# Patient Record
Sex: FEMALE | Race: WHITE
[De-identification: names, ages, dates, MRNs, and addresses within clinical notes are randomized per-mention and may not be internally consistent; named-entity substitution may affect disease eponyms.]

---

## 2020-05-12 ENCOUNTER — HOSPITAL ENCOUNTER (OUTPATIENT)
Dept: HOSPITAL 53 - M SFHCWAGY | Age: 50
End: 2020-05-12
Attending: NURSE PRACTITIONER
Payer: COMMERCIAL

## 2020-05-12 ENCOUNTER — HOSPITAL ENCOUNTER (OUTPATIENT)
Dept: HOSPITAL 53 - M WHC | Age: 50
End: 2020-05-12
Attending: NURSE PRACTITIONER
Payer: COMMERCIAL

## 2020-05-12 DIAGNOSIS — Z78.0: ICD-10-CM

## 2020-05-12 DIAGNOSIS — Z12.31: Primary | ICD-10-CM

## 2020-05-12 DIAGNOSIS — Z80.8: ICD-10-CM

## 2020-05-12 DIAGNOSIS — R87.610: Primary | ICD-10-CM

## 2020-05-26 NOTE — REPMRS
Patient History

The patient states she had a clinical breast exam in May 2020.

 

Patient is postmenopausal and is nulliparous.

Family history of unknown cancer in father.

 

Digital Woman Screen Mammo: May 12, 2020 - Exam #: 

[id number]

Bilateral CC and MLO view(s) were taken.

 

Technologist: Kerry Nugent, Technologist

Prior study comparison: February 1, 2019, bilateral digital woman

screen mammo, performed at [facility].

 

FINDINGS:  The breast tissue is almost entirely fat.  The Volpara

volumetric breast density category is: A.  There has been no 

change in the appearance of the mammogram from the prior studies.

There is no interval development of dominant mass, 

architectural distortion, or grouped microcalcification typical 

of malignancy.

3-D tomosynthesis shows no additional findings.

 

Assessment: BI-RADS/ACR category 1 mammogram. Negative Mammogram.

 

Recommendation

Routine screening mammogram of both breasts in 1 year (for women 

over age 40).

This patient's Lifetime Breast Cancer RIsk is estimated at 12.0 

%.

This mammogram was interpreted with the aid of an FDA-approved 

computer-aided dectection system.

 

Electronically Signed By: Ej Lopez MD 05/26/20 5270

## 2021-01-26 ENCOUNTER — HOSPITAL ENCOUNTER (OUTPATIENT)
Dept: HOSPITAL 53 - M RAD | Age: 51
End: 2021-01-26
Attending: INTERNAL MEDICINE
Payer: COMMERCIAL

## 2021-01-26 DIAGNOSIS — K43.9: Primary | ICD-10-CM

## 2021-01-26 PROCEDURE — 74177 CT ABD & PELVIS W/CONTRAST: CPT

## 2021-01-27 NOTE — REP
INDICATION:

VENTRAL HERNIA.



COMPARISON:

None



TECHNIQUE:

Axial contrast-enhanced images from the lung bases to the pubic symphysis using oral

and 100 cc Isovue 370 intravenous contrast material.  Coronal and sagittal

reformations obtained.



This CT examination was performed using the following dose reduction techniques:

Automated exposure control, adjustment of mA and/or kv according to the patient's

size, and the use of iterative reconstruction technique.



FINDINGS:

There appears to be eventration of the anterior abdominal wall and possible associated

ventral hernia along the right mid to lower abdomen/pelvis which contain fat and

nonobstructed bowel.



Liver, spleen, pancreas, gallbladder, left kidney and right adrenal gland are normal.

Left adrenal gland includes 1.5 cm adenoma.  Right kidney suggests small punctate

nonobstructing calculi.  There is no evidence for bowel obstruction or acute

inflammatory process.



Pelvis demonstrates normal bladder and age-appropriate uterus/right adnexa.  There is

a complex septated left adnexal cyst measuring 4.5 x 6.6 x 4.0 cm.



No ascites.  No free air.  No adenopathy.  Abdominal aorta without aneurysm or

dissection.  Skeletal structures demonstrate age-related degenerative changes without

acute osseous abnormality.  Lung bases are clear.



IMPRESSION:

1. Eventration along the right anterior abdominal versus wide ventral hernia contains

fat and nonobstructed bowel.  No associated inflammatory changes or obstruction noted.

2. 6.6 cm complex septated left adnexal cyst warrants follow-up.

3. Further nonacute findings as described above.







<Electronically signed by Timmy Cruz > 01/27/21 1042

## 2021-01-29 ENCOUNTER — HOSPITAL ENCOUNTER (OUTPATIENT)
Dept: HOSPITAL 53 - M RAD | Age: 51
End: 2021-01-29
Attending: INTERNAL MEDICINE
Payer: COMMERCIAL

## 2021-01-29 DIAGNOSIS — K43.9: Primary | ICD-10-CM

## 2021-01-29 NOTE — REP
INDICATION:

VENTRAL HERNIA, ABD PAIN, EVAL FOR ADHESIONS.



COMPARISON:



None



TECHNIQUE:

This procedure was performed by Sara Treviño [facility], under the direct

supervision of Dr. Lopez.  Images were reviewed with Dr. Lopez prior to dictation.



Liquid barium and gas producing crystals were given in the erect position, as well as

liquid barium in the prone oblique position in order to perform a double contrast

upper GI examination.  Additionally liquid barium was given at the end of the

examination in order to perform a small-bowel follow-through.





FINDINGS:

The  film shows no organomegaly or pathological masses.  The intestinal gas

pattern is unremarkable.



The oral and pharyngeal stages of deglutition were unremarkable.  Flash penetration

was visualized.  Esophageal transport is prompt and efficient and there is no evidence

of esophagitis, stricture, or mucosal ring.  There is no evidence of a hiatal hernia.

There was no gastroesophageal reflux noted .



The stomach walls are normally outlined.  The rugal folds are smooth and regular.

There is no gastritis, neoplasm, or ulcerative disease.



The duodenal walls are normally outlined.  There is a diverticulum of the 3rd portion

of the duodenum.  The mucosal folds are smooth and regular.  There is no duodenitis,

peptic ulcer disease or neoplasm.  The visualized portion of the proximal small bowel

appears normal in course and caliber.



The barium column was followed through the small bowel to the level of the terminal

ileum.  Small bowel transit time is approximately 150 minutes.  During fluoroscopy

gentle palpation shows all loops are freely movable and pliable.  There is no fixed

angulated loops.  The small bowel mucosal pattern is normal in course and caliber.

There is no transition to suggest a partial small bowel obstruction.  Spot filming of

the terminal ileum shows it to be unremarkable.





IMPRESSION:

1.  Flash penetration.

2.  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

3.  Unremarkable small bowel follow-through.



0.5 minutes of fluoroscopy time was utilized for this procedure. Some fluoroscopic

images are performed with last image hold technology.  These images require no

additional radiation.



<Electronically signed by Sara Treviño > 01/29/21 1608

<Electronically signed by Ej Lopez > 01/29/21 [id number]

## 2021-03-22 ENCOUNTER — HOSPITAL ENCOUNTER (OUTPATIENT)
Dept: HOSPITAL 53 - M LAB | Age: 51
End: 2021-03-22
Attending: OBSTETRICS & GYNECOLOGY
Payer: COMMERCIAL

## 2021-03-22 DIAGNOSIS — N83.292: Primary | ICD-10-CM

## 2021-04-08 ENCOUNTER — HOSPITAL ENCOUNTER (OUTPATIENT)
Dept: HOSPITAL 53 - M RAD | Age: 51
End: 2021-04-08
Attending: PHYSICIAN ASSISTANT
Payer: COMMERCIAL

## 2021-04-08 DIAGNOSIS — M51.37: Primary | ICD-10-CM

## 2021-04-08 NOTE — REP
INDICATION:

PAIN IN THORACIC SPINE.



COMPARISON:

None.



TECHNIQUE:

Sagittal T1, T2, stir images of the thoracic spine are obtained.  Axial T1 and T2

weighted images are obtained.



FINDINGS:

There is mild-to-moderate multilevel degenerative disc disease with loss of disc

height and disc desiccation seen diffusely throughout the thoracic spine.  Vertebral

heights are overall preserved.  No malalignments.  On the sagittal T2 weighted images,

no limiting canal stenosis.  Thoracic cord appears normal in its course, caliber and

signal characteristics.



On the review of axial images, there are shallow disc protrusions at midthoracic

level, greater on the left, which mildly narrow the central canal but no significant

impingement of the thoracic cord, and no definite limiting foraminal stenosis or focal

disc herniation.



IMPRESSION:

No acute findings.  No disc herniations, no limiting canal or foraminal stenosis.





<Electronically signed by Jorge Carroll > 04/08/21 1154

## 2021-04-08 NOTE — REP
INDICATION:

DDD, THORACIC PAIN.



COMPARISON:

CT abdomen pelvis 01/26/2021



TECHNIQUE:

Sagittal T1, T2, stir images of the lumbar spine are obtained.  Axial T1 and T2

weighted images obtained.



FINDINGS:

On the sagittal T2 weighted images, there is mild-to-moderate multilevel degenerative

disc disease, appears slightly more progressed at the L5-S1 level.  Vertebral heights

are overall preserved.  No malalignments.  On the sagittal T2 weighted images, the

conus ends normally at L1 level.  There is no significant canal stenosis at lumbar

levels.



On the review of axial images,

At L1-2 through L3-4 no significant canal or foraminal narrowing.

At L4-5 global disc bulge with mild bilateral foraminal narrowing and no significant

canal stenosis.

At L5-S1 loss of disc height with moderate bilateral foraminal narrowing that appears

greater on the right.  No significant canal stenosis at this level.



IMPRESSION:

Mild, multilevel degenerative disc disease.  No limiting canal stenosis or high-grade

foraminal stenosis.

At L5-S1 loss of disc height with moderate bilateral foraminal narrowing that appears

greater on the right.





<Electronically signed by Jorge Carroll > 04/08/21 0897

## 2021-04-27 ENCOUNTER — HOSPITAL ENCOUNTER (OUTPATIENT)
Dept: HOSPITAL 53 - M RAD | Age: 51
End: 2021-04-27
Attending: OBSTETRICS & GYNECOLOGY
Payer: COMMERCIAL

## 2021-04-27 DIAGNOSIS — N83.292: Primary | ICD-10-CM

## 2021-04-27 NOTE — REP
INDICATION:

OVARIAN CYST.



COMPARISON:

03/22/2021



TECHNIQUE:

Transvesical and transvaginal imaging



FINDINGS:

The uterus measures 4.4 x 2.1 x 2.6 cm.  The parenchymal echo pattern is unchanged.

The endometrial echo complex measures 8 mm in its greatest thickness.



Right ovary measures 2.1 x 1.2 x 1.1 cm and is within normal limits.



Left ovary measures 6.6 x 2.7 x 6.3 cm.  Within the left adnexa there is a complex 5.9

x 2.5 x 5.2 cm sized cystic mass.



The urinary bladder was empty and could not be measured



IMPRESSION:

Complex left adnexal cystic mass essentially unchanged.





<Electronically signed by Orlin Hastings > 04/27/21 0937

## 2021-09-02 ENCOUNTER — HOSPITAL ENCOUNTER (OUTPATIENT)
Dept: HOSPITAL 53 - M RAD | Age: 51
End: 2021-09-02
Attending: FAMILY MEDICINE
Payer: COMMERCIAL

## 2021-09-02 DIAGNOSIS — R10.9: Primary | ICD-10-CM

## 2021-09-02 PROCEDURE — 74177 CT ABD & PELVIS W/CONTRAST: CPT

## 2021-09-03 NOTE — REP
INDICATION:

ABD PAIN.



COMPARISON:

01/26/2021



TECHNIQUE:

Axial contrast-enhanced images from the lung bases to the pubic symphysis using oral

and 100 cc Isovue 370 intravenous contrast material.  Coronal and sagittal

reformations obtained.



This CT examination was performed using the following dose reduction techniques:

Automated exposure control, adjustment of mA and/or kv according to the patient's

size, and the use of iterative reconstruction technique.



FINDINGS:

Liver demonstrates mild fatty infiltration.  The adrenal glands demonstrate small

stable benign adenomas.



Spleen, pancreas, gallbladder, and kidneys are normal.  3 mm nonobstructing right

renal calculus is suspected.



The current examination demonstrates relatively recent postoperative changes in the

midline subcutaneous tissue including small amount of fluid, inflammatory stranding

and granulation tissue.  There is a moderate to large new right lateral ventral hernia

(spigelian hernia) containing nonobstructed loops of small and large bowel.  Remainder

of the enteric system is without obstruction or acute inflammatory process.  Normal

terminal ileum and appendix are identified in the right lower quadrant.  Evidence for

prior partial sigmoid resection.



Pelvis demonstrates normal bladder and age-appropriate uterus/right adnexa.  Tubular

cystic structure in the left adnexa is again noted and may represent hydrosalpinx.



No ascites.  No free air.  No intraperitoneal or retroperitoneal adenopathy.

Abdominal aorta and vasculature appear normal.  Musculoskeletal structures are intact

and without acute osseous abnormality.



IMPRESSION:

1. Postsurgical changes in the midline anterior abdominal wall with small amount of

fluid.  Correlation and follow-up is recommended to exclude the possibility of forming

small phlegmon/abscess.

2. New right lateral ventral (spigelian) hernia containing nonobstructed loops of

small and large bowel.

3. Stable tubular structure in the left adnexa suggesting hydrosalpinx.







<Electronically signed by Timmy Cruz > 09/03/21 [id number]

## 2021-10-29 ENCOUNTER — HOSPITAL ENCOUNTER (OUTPATIENT)
Dept: HOSPITAL 53 - M RAD | Age: 51
End: 2021-10-29
Attending: OBSTETRICS & GYNECOLOGY
Payer: COMMERCIAL

## 2021-10-29 DIAGNOSIS — N83.292: Primary | ICD-10-CM

## 2021-10-29 NOTE — REP
INDICATION:

OTH OVARIAN CYST LT SIDE.



COMPARISON:

04/27/2021



TECHNIQUE:

Transvesical and transvaginal imaging



FINDINGS:

The uterus measures 3.5 x 2.1 x 2.2 cm and is unchanged.  The endometrial echo complex

measures 2 mm in thickness and is unchanged.



The right ovary measures 1.9 x 1.2 x 2 cm and is unchanged with an RI 0.43



The left ovary was not visualized transvesical air transvaginally.  Large serpiginous

appearing tubular shaped nearly anechoic structures are seen in the left adnexa.



Urinary bladder was empty



IMPRESSION:

The left ovary was not visualized on today's exam, however, tubular structures in the

left adnexa as described above somewhat concerning for hydrosalpinx.





<Electronically signed by Orlin Hastings > 10/29/21 [id number]

## 2021-11-23 ENCOUNTER — APPOINTMENT (OUTPATIENT)
Dept: SURGERY | Facility: CLINIC | Age: 51
End: 2021-11-23

## 2022-03-08 ENCOUNTER — HOSPITAL ENCOUNTER (OUTPATIENT)
Dept: HOSPITAL 53 - M WHC | Age: 52
End: 2022-03-08
Attending: FAMILY MEDICINE
Payer: COMMERCIAL

## 2022-03-08 DIAGNOSIS — Z12.31: Primary | ICD-10-CM

## 2023-05-22 PROBLEM — Z00.00 ENCOUNTER FOR PREVENTIVE HEALTH EXAMINATION: Status: ACTIVE | Noted: 2023-05-22
